# Patient Record
(demographics unavailable — no encounter records)

---

## 2025-01-31 NOTE — HISTORY OF PRESENT ILLNESS
[5] : 5 [2] : 2 [Dull/Aching] : dull/aching [Intermittent] : intermittent [Rest] : rest [Lying in bed] : lying in bed [] : no [FreeTextEntry5] : 81 y/o M here for right shoulder pain. About three months he has been having achy pain from his shoulder radiating into his chest. ROM is limited. Denies prior treatment.  [FreeTextEntry7] : into chest

## 2025-01-31 NOTE — HISTORY OF PRESENT ILLNESS
[5] : 5 [2] : 2 [Dull/Aching] : dull/aching [Intermittent] : intermittent [Rest] : rest [Lying in bed] : lying in bed [] : no [FreeTextEntry5] : 83 y/o M here for right shoulder pain. About three months he has been having achy pain from his shoulder radiating into his chest. ROM is limited. Denies prior treatment.  [FreeTextEntry7] : into chest

## 2025-01-31 NOTE — ASSESSMENT
[FreeTextEntry1] : The patient is an 82-year-old male with chief complaint of right shoulder pain.  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  He is left-hand dominant.  3 months ago he began having pain in the right shoulder.  Is mostly weak and painful doing overhead activities.  He has modified his activities over the last several weeks and is definitely feeling better.  He denies any night pain.  He takes no anti-inflammatories.  He said no therapy or injections.  Examination of the right upper extremity reveals normal neurovascular exam.  Examination of the right shoulder reveals forward elevation of 130 degrees, external rotation 150 degrees, and internal rotation to L2.  There is 4/5 strength of the supraspinatus and infraspinatus with normal teres minor and normal deltoid.  He has pain and crepitation at the extremes of motion.  There is no instability or apprehension.  There is no pain or deformity over the AC joint.  X-rays done in the office today of the right shoulder 3 views including internal and external rotation views and a weighted view show bone-on-bone arthritis of the glenohumeral joint with proximal migration to the undersurface of the acromion.  There are no obvious tumors, masses or calcifications seen.  X-rays done in the office today of the right scapula 2 views including axillary view and scapular Y view show glenohumeral arthritis.  There is proximal migration.  There is no obvious tumors, mass or calcifications seen.  The patient has rotator cuff tear arthropathy with mild to moderate pain and dysfunction.  At this point no treatment is necessary.  He will continue to modify his activities and take anti-inflammatories as needed.  If his pain worsens we can consider subacromial cortisone injection.  If the pain becomes severe he will be a candidate for a reverse right total shoulder replacement.  I will see him back in the office as needed.

## 2025-04-22 NOTE — HISTORY OF PRESENT ILLNESS
[5] : 5 [2] : 2 [Dull/Aching] : dull/aching [] : yes [Intermittent] : intermittent [Rest] : rest [Lying in bed] : lying in bed [FreeTextEntry5] : 81 y/o M presents for f/u eval today.  [FreeTextEntry7] : into chest

## 2025-04-22 NOTE — ASSESSMENT
[FreeTextEntry1] : The patient is here for right shoulder pain. He wishes to consider a CSI. He discussed this previously. 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  He is left-hand dominant.  3 months ago he began having pain in the right shoulder.  Is mostly weak and painful doing overhead activities.  He has modified his activities over the last several weeks and is definitely feeling better.  He denies any night pain.  He takes no anti-inflammatories.  He said no therapy or injections.  Right shoulder exam: Neurovascularly intact. Sensation intact.  Examination of the right shoulder reveals forward elevation of 130 degrees, external rotation 150 degrees, and internal rotation to L2.  There is 4/5 strength of the supraspinatus and infraspinatus with normal teres minor and normal deltoid.  He has pain and crepitation at the extremes of motion.  There is no instability or apprehension.  There is no pain or deformity over the AC joint.  The patient received a right shoulder subacromial CSI. He tolerated it well. He will return as needed.